# Patient Record
Sex: MALE | Race: WHITE | NOT HISPANIC OR LATINO | URBAN - METROPOLITAN AREA
[De-identification: names, ages, dates, MRNs, and addresses within clinical notes are randomized per-mention and may not be internally consistent; named-entity substitution may affect disease eponyms.]

---

## 2018-08-01 ENCOUNTER — IMPORTED ENCOUNTER (OUTPATIENT)
Dept: URBAN - METROPOLITAN AREA CLINIC 9 | Facility: CLINIC | Age: 73
End: 2018-08-01

## 2018-10-03 ENCOUNTER — IMPORTED ENCOUNTER (OUTPATIENT)
Dept: URBAN - METROPOLITAN AREA CLINIC 9 | Facility: CLINIC | Age: 73
End: 2018-10-03

## 2019-08-21 ENCOUNTER — IMPORTED ENCOUNTER (OUTPATIENT)
Dept: URBAN - METROPOLITAN AREA CLINIC 9 | Facility: CLINIC | Age: 74
End: 2019-08-21

## 2020-01-31 ENCOUNTER — IMPORTED ENCOUNTER (OUTPATIENT)
Dept: URBAN - METROPOLITAN AREA CLINIC 9 | Facility: CLINIC | Age: 75
End: 2020-01-31

## 2020-11-20 ENCOUNTER — IMPORTED ENCOUNTER (OUTPATIENT)
Dept: URBAN - METROPOLITAN AREA CLINIC 9 | Facility: CLINIC | Age: 75
End: 2020-11-20

## 2021-01-19 NOTE — PATIENT DISCUSSION
Iritis blood panel needed, instructions given to pt. Restart Pred Acetate 1 gtt Q2h OD x 1 week , then QID OD x 1 week,  then TID OD x 1 week, BID OD x 1 week, QD OD x 1 week, then DC. RTO in 3 weeks for IOP check.

## 2021-01-19 NOTE — PATIENT DISCUSSION
1. 27.2021 Lab results reviewed w/pt. + for Lyme dz. Per [facility], pt to start Doxy 100mg bid PO x 2 wks. RX sent to pharmacy. Pt to keep scheduled follow up appt.

## 2021-02-09 NOTE — PATIENT DISCUSSION
testing results were suspicious for Lyme (but not conclusive).   We have recommended a full treatment for Lyme.

## 2021-02-12 ENCOUNTER — IMPORTED ENCOUNTER (OUTPATIENT)
Dept: URBAN - METROPOLITAN AREA CLINIC 9 | Facility: CLINIC | Age: 76
End: 2021-02-12

## 2021-02-26 ENCOUNTER — IMPORTED ENCOUNTER (OUTPATIENT)
Dept: URBAN - METROPOLITAN AREA CLINIC 9 | Facility: CLINIC | Age: 76
End: 2021-02-26

## 2021-03-12 ENCOUNTER — IMPORTED ENCOUNTER (OUTPATIENT)
Dept: URBAN - METROPOLITAN AREA CLINIC 9 | Facility: CLINIC | Age: 76
End: 2021-03-12

## 2021-03-16 NOTE — PATIENT DISCUSSION
3.16.21 rebound iritis today. Restart Pred Acetate q2h OD x 1 wk, and taper weekly to tid. Once at bid and qd, taper q2 weeks.

## 2021-03-26 ENCOUNTER — IMPORTED ENCOUNTER (OUTPATIENT)
Dept: URBAN - METROPOLITAN AREA CLINIC 9 | Facility: CLINIC | Age: 76
End: 2021-03-26

## 2021-04-09 ENCOUNTER — IMPORTED ENCOUNTER (OUTPATIENT)
Dept: URBAN - METROPOLITAN AREA CLINIC 9 | Facility: CLINIC | Age: 76
End: 2021-04-09

## 2021-04-27 NOTE — PATIENT DISCUSSION
04/27/21 : patient is doing much better on todays exam , eye is white and quite/ Deep and quite. REC  patient to use PRED QDAY X 14 days then Stop. patient to return for cataract consult.

## 2021-04-30 ENCOUNTER — IMPORTED ENCOUNTER (OUTPATIENT)
Dept: URBAN - METROPOLITAN AREA CLINIC 9 | Facility: CLINIC | Age: 76
End: 2021-04-30

## 2021-05-18 NOTE — PATIENT DISCUSSION
5/18/21: Patient was put on Timolol qday OD only in order to reduce pressure caused by Iritis and residual steroid use.

## 2021-05-18 NOTE — PATIENT DISCUSSION
5/18/21: Patient was put on Timolol qday OD only in order to reduce pressure caused by Iritis and residual steroid use. Follow-up with Dr. Bri Babcock for IOP check in 3-4 months at the [place].

## 2021-06-08 NOTE — PATIENT DISCUSSION
5/18/21: Patient was put on Timolol qday OD only in order to reduce pressure caused by Iritis and residual steroid use. Follow-up with Dr. Deanne Gaytan for IOP check in 3-4 months at the [place].

## 2021-06-08 NOTE — PATIENT DISCUSSION
Pt. is approved for cataract surgery, pending approval from [facility] to proceed with surgery.

## 2021-06-08 NOTE — PATIENT DISCUSSION
6/8/21: Pt to start Pred acetate for OD at qid for 1 wk, tid for 1 wk, bid for 2 wks, qday for 2 wks to help with Iritis. Pt will also continue Timolol gtts qday OD to maintain reduced pressures.

## 2021-06-18 ENCOUNTER — IMPORTED ENCOUNTER (OUTPATIENT)
Dept: URBAN - METROPOLITAN AREA CLINIC 9 | Facility: CLINIC | Age: 76
End: 2021-06-18

## 2021-06-22 NOTE — PATIENT DISCUSSION
5/18/21: Patient was put on Timolol qday OD only in order to reduce pressure caused by Iritis and residual steroid use. Follow-up with Dr. Joel Ortiz for IOP check in 3-4 months at the [place].

## 2021-06-22 NOTE — PATIENT DISCUSSION
Pt. is approved for cataract surgery, pending approval from [facility] to proceed with surgery.

## 2021-06-29 NOTE — PATIENT DISCUSSION
5/18/21: Patient was put on Timolol qday OD only in order to reduce pressure caused by Iritis and residual steroid use. Follow-up with Dr. Arleen Medina for IOP check in 3-4 months at the [place].

## 2021-06-29 NOTE — PATIENT DISCUSSION
6/29/21: Pt. will continue Pred Taper. Pressure is stable today. Patient will also be staying on Timolol drop in order to keep pressure in eye stable.

## 2021-07-02 ENCOUNTER — IMPORTED ENCOUNTER (OUTPATIENT)
Dept: URBAN - METROPOLITAN AREA CLINIC 9 | Facility: CLINIC | Age: 76
End: 2021-07-02

## 2021-07-27 NOTE — PATIENT DISCUSSION
5/18/21: Patient was put on Timolol qday OD only in order to reduce pressure caused by Iritis and residual steroid use. Follow-up with Dr. Deric Triplett for IOP check in 3-4 months at the [place].

## 2021-07-27 NOTE — PATIENT DISCUSSION
7. 27.2021. cont Pred Acetate qd x 2 weeks, then d/c. If asymptomatic, OK to stay off. Stop Timolol when finished with Pred Acetate. Pt to call ASAP if symptoms recur.

## 2021-09-14 NOTE — PATIENT DISCUSSION
5/18/21: Patient was put on Timolol qday OD only in order to reduce pressure caused by Iritis and residual steroid use. Follow-up with Dr. Bree Clayton for IOP check in 3-4 months at the [place].

## 2021-09-14 NOTE — PATIENT DISCUSSION
9/14/21: Pt to start extended taper of pred; tid 2 weeks, bid 2 weeks, qday to continue until next follow up w/ [facility]. RTC in a month for F/U, if IOP is over 20, restart timolol. discontinued cyclogyl today.

## 2021-10-12 NOTE — PATIENT DISCUSSION
5/18/21: Patient was put on Timolol qday OD only in order to reduce pressure caused by Iritis and residual steroid use. Follow-up with Dr. Hernandez Form for IOP check in 3-4 months at the [place].

## 2021-10-12 NOTE — PATIENT DISCUSSION
10/12/21: restart Timolol QD OD only. Continue PA QD OD only. If IOP in control at next visit will bring back in 3-4 mos for IOP check w/ [facility]. Will send notes in to [place] today.  1gtt Timolol in office today @9:53am.

## 2021-10-16 ASSESSMENT — TONOMETRY
OS_IOP_MMHG: 13
OS_IOP_MMHG: 17
OS_IOP_MMHG: 15
OD_IOP_MMHG: 14
OD_IOP_MMHG: 15
OD_IOP_MMHG: 17
OD_IOP_MMHG: 18
OS_IOP_MMHG: 15
OD_IOP_MMHG: 16
OD_IOP_MMHG: 15
OD_IOP_MMHG: 14
OS_IOP_MMHG: 12
OD_IOP_MMHG: 25
OS_IOP_MMHG: 19
OS_IOP_MMHG: 18
OS_IOP_MMHG: 22
OD_IOP_MMHG: 12
OS_IOP_MMHG: 15

## 2021-10-16 ASSESSMENT — VISUAL ACUITY
OD_CC: 20/50 - SN
OS_CC: 20/30 - SN
OD_CC: 20/40 + SN
OD_CC: 20/25 -2 SN
OS_CC: 20/40 SN
OD_SC: 20/40 SN
OD_PH: 20/40 SN
OD_CC: 20/40 + SN
OD_CC: 20/25 SN
OS_SC: 20/50 SN
OD_CC: 20/25 -2 SN
OD_CC: 20/30 - SN
OD_SC: 20/30 - SN
OS_CC: 20/30 -2 SN
OS_CC: 20/25 -2 SN
OS_CC: 20/40 - SN
OS_CC: 20/40 SN
OS_CC: 20/50 + SN
OD_CC: 20/30 + SN
OD_CC: 20/20 -2 SN
OS_SC: 20/50 SN
OD_CC: 20/20 SN
OS_CC: 20/30 - SN
OD_CC: 20/20 SN
OS_CC: 20/30 + SN
OS_PH: 20/40 SN
OS_SC: 20/200 SN
OD_CC: 20/25 - SN
OD_SC: 20/400 SN
OS_CC: 20/40 SN
OS_CC: 20/30 + SN
OS_CC: 20/30 + SN
OD_CC: 20/50 - SN

## 2021-11-01 ENCOUNTER — ESTABLISHED PATIENT (OUTPATIENT)
Dept: URBAN - METROPOLITAN AREA CLINIC 9 | Facility: CLINIC | Age: 76
End: 2021-11-01

## 2021-11-01 DIAGNOSIS — H01.021: ICD-10-CM

## 2021-11-01 DIAGNOSIS — H01.024: ICD-10-CM

## 2021-11-01 DIAGNOSIS — H16.223: ICD-10-CM

## 2021-11-01 DIAGNOSIS — H35.3131: ICD-10-CM

## 2021-11-01 PROCEDURE — 92134 CPTRZ OPH DX IMG PST SGM RTA: CPT

## 2021-11-01 PROCEDURE — 92014 COMPRE OPH EXAM EST PT 1/>: CPT

## 2021-11-01 ASSESSMENT — TONOMETRY
OS_IOP_MMHG: 17
OD_IOP_MMHG: 19

## 2021-11-01 ASSESSMENT — VISUAL ACUITY
OS_SC: 20/70
OD_SC: 20/20
OD_CC: 20/20
OS_CC: 20/30

## 2021-11-09 NOTE — PATIENT DISCUSSION
5/18/21: Patient was put on Timolol qday OD only in order to reduce pressure caused by Iritis and residual steroid use. Follow-up with Dr. Lex Can for IOP check in 3-4 months at the [place].

## 2022-02-04 NOTE — PATIENT DISCUSSION
2. 4.2022 OD quiet today. Will cont Pred qd x 1 m then qod. Increase Timolol to bid OD until IOP reduces.

## 2022-02-04 NOTE — PATIENT DISCUSSION
Pt. is approved for cataract surgery, requesting approval from [place] to proceed with surgery OD.

## 2022-05-12 NOTE — PATIENT DISCUSSION
5/18/21: Patient was put on Timolol qday OD only in order to reduce pressure caused by Iritis and residual steroid use. Follow-up with Dr. Julianna Rebolledo for IOP check in 3-4 months at the [place].

## 2022-05-12 NOTE — PATIENT DISCUSSION
The types of intraocular lenses were reviewed with the patient along with a discussion of their various strengths and weaknesses. Patient elects.

## 2022-05-12 NOTE — PATIENT DISCUSSION
RNFL obtained today. Patient has glaucoma OD. recommend switching timolol to Combigan. Will do iStent and Goniotomy with cataract surgery.

## 2022-05-12 NOTE — PATIENT DISCUSSION
5/12/22 JOD: [facility] - patient treated for lyme disease based on positive test results. IOP elevated still. Steroid responder. Patient has only been doing Timolol qday. Recommend increasing to BID OD.

## 2022-06-20 RX ORDER — LORAZEPAM 0.5 MG/1
TABLET ORAL
COMMUNITY
Start: 2021-11-30

## 2022-06-20 RX ORDER — LEVOTHYROXINE SODIUM 0.05 MG/1
TABLET ORAL
COMMUNITY
Start: 2021-11-30

## 2022-06-20 RX ORDER — IBUPROFEN 200 MG
TABLET ORAL
COMMUNITY

## 2022-06-20 RX ORDER — ESCITALOPRAM OXALATE 5 MG/1
TABLET ORAL
COMMUNITY
Start: 2022-04-13

## 2022-06-24 NOTE — PATIENT DISCUSSION
5/18/21: Patient was put on Timolol qday OD only in order to reduce pressure caused by Iritis and residual steroid use. Follow-up with Dr. Jean Neri for IOP check in 3-4 months at the [place].

## 2022-06-24 NOTE — PATIENT DISCUSSION
5/18/21: Patient was put on Timolol qday OD only in order to reduce pressure caused by Iritis and residual steroid use. Follow-up with Dr. Ananya Torres for IOP check in 3-4 months at the [facility].

## 2022-06-27 NOTE — PATIENT DISCUSSION
5/18/21: Patient was put on Timolol qday OD only in order to reduce pressure caused by Iritis and residual steroid use. Follow-up with Dr. Holli Phillips for IOP check in 3-4 months at the [place].

## 2022-07-06 NOTE — PATIENT DISCUSSION
5/18/21: Patient was put on Timolol qday OD only in order to reduce pressure caused by Iritis and residual steroid use. Follow-up with Dr. Jerry Velasco for IOP check in 3-4 months at the [place].

## 2022-07-25 NOTE — PATIENT DISCUSSION
5/18/21: Patient was put on Timolol qday OD only in order to reduce pressure caused by Iritis and residual steroid use. Follow-up with Dr. Volodymyr Billings for IOP check in 3-4 months at the [place].

## 2022-11-18 ENCOUNTER — ESTABLISHED PATIENT (OUTPATIENT)
Dept: URBAN - METROPOLITAN AREA CLINIC 9 | Facility: CLINIC | Age: 77
End: 2022-11-18

## 2022-11-18 DIAGNOSIS — H16.223: ICD-10-CM

## 2022-11-18 DIAGNOSIS — H43.813: ICD-10-CM

## 2022-11-18 DIAGNOSIS — H35.3131: ICD-10-CM

## 2022-11-18 PROCEDURE — 92015 DETERMINE REFRACTIVE STATE: CPT

## 2022-11-18 PROCEDURE — 92014 COMPRE OPH EXAM EST PT 1/>: CPT

## 2022-11-18 PROCEDURE — 92134 CPTRZ OPH DX IMG PST SGM RTA: CPT

## 2022-11-18 ASSESSMENT — VISUAL ACUITY
OS_SC: 20/50-2
OD_CC: 20/25
OS_CC: 20/60
OD_SC: 20/25-2

## 2022-11-18 ASSESSMENT — TONOMETRY
OS_IOP_MMHG: 10
OD_IOP_MMHG: 11

## 2023-01-05 NOTE — PATIENT DISCUSSION
7. 27.2021. cont Pred Acetate qd x 2 weeks, then d/c. If asymptomatic, OK to stay off. Stop Timolol when finished with Pred Acetate. Pt to call ASAP if symptoms recur. 8/2/2021 continue on statin therapy.

## 2023-01-05 NOTE — PATIENT DISCUSSION
5/18/21: Patient was put on Timolol qday OD only in order to reduce pressure caused by Iritis and residual steroid use. Follow-up with Dr. Mikaela Antoine for IOP check in 3-4 months at the [place].

## 2023-07-12 ENCOUNTER — ESTABLISHED PATIENT (OUTPATIENT)
Dept: URBAN - METROPOLITAN AREA CLINIC 9 | Facility: CLINIC | Age: 78
End: 2023-07-12

## 2023-07-12 DIAGNOSIS — H35.3131: ICD-10-CM

## 2023-07-12 DIAGNOSIS — H43.813: ICD-10-CM

## 2023-07-12 PROCEDURE — 92134 CPTRZ OPH DX IMG PST SGM RTA: CPT

## 2023-07-12 PROCEDURE — 92014 COMPRE OPH EXAM EST PT 1/>: CPT

## 2023-07-12 ASSESSMENT — VISUAL ACUITY
OD_SC: 20/30
OU_SC: 20/25-2
OS_SC: 20/30

## 2023-07-12 ASSESSMENT — TONOMETRY
OD_IOP_MMHG: 25
OS_IOP_MMHG: 25

## 2024-09-17 ENCOUNTER — COMPREHENSIVE EXAM (OUTPATIENT)
Dept: URBAN - NONMETROPOLITAN AREA CLINIC 6 | Facility: CLINIC | Age: 79
End: 2024-09-17

## 2024-09-17 DIAGNOSIS — H43.813: ICD-10-CM

## 2024-09-17 DIAGNOSIS — H35.3131: ICD-10-CM

## 2024-09-17 PROCEDURE — 92250 FUNDUS PHOTOGRAPHY W/I&R: CPT

## 2024-09-17 PROCEDURE — 99214 OFFICE O/P EST MOD 30 MIN: CPT
